# Patient Record
Sex: MALE | Race: WHITE | NOT HISPANIC OR LATINO | URBAN - METROPOLITAN AREA
[De-identification: names, ages, dates, MRNs, and addresses within clinical notes are randomized per-mention and may not be internally consistent; named-entity substitution may affect disease eponyms.]

---

## 2017-03-21 ENCOUNTER — ANESTHESIA EVENT (OUTPATIENT)
Dept: GASTROENTEROLOGY | Facility: CLINIC | Age: 52
End: 2017-03-21
Payer: COMMERCIAL

## 2017-03-21 ENCOUNTER — OFFICE VISIT (OUTPATIENT)
Dept: FAMILY MEDICINE | Facility: CLINIC | Age: 52
End: 2017-03-21
Payer: COMMERCIAL

## 2017-03-21 VITALS
OXYGEN SATURATION: 97 % | SYSTOLIC BLOOD PRESSURE: 146 MMHG | HEIGHT: 71 IN | HEART RATE: 99 BPM | TEMPERATURE: 97.3 F | RESPIRATION RATE: 18 BRPM | BODY MASS INDEX: 37.66 KG/M2 | DIASTOLIC BLOOD PRESSURE: 90 MMHG | WEIGHT: 269 LBS

## 2017-03-21 DIAGNOSIS — E11.9 TYPE 2 DIABETES MELLITUS WITHOUT COMPLICATION, WITH LONG-TERM CURRENT USE OF INSULIN (H): ICD-10-CM

## 2017-03-21 DIAGNOSIS — K21.9 GASTROESOPHAGEAL REFLUX DISEASE, ESOPHAGITIS PRESENCE NOT SPECIFIED: Primary | ICD-10-CM

## 2017-03-21 DIAGNOSIS — E78.2 MIXED HYPERLIPIDEMIA: ICD-10-CM

## 2017-03-21 DIAGNOSIS — R11.2 NON-INTRACTABLE VOMITING WITH NAUSEA, UNSPECIFIED VOMITING TYPE: ICD-10-CM

## 2017-03-21 DIAGNOSIS — I10 BENIGN ESSENTIAL HYPERTENSION: ICD-10-CM

## 2017-03-21 DIAGNOSIS — Z79.4 TYPE 2 DIABETES MELLITUS WITHOUT COMPLICATION, WITH LONG-TERM CURRENT USE OF INSULIN (H): ICD-10-CM

## 2017-03-21 PROCEDURE — 99204 OFFICE O/P NEW MOD 45 MIN: CPT | Performed by: FAMILY MEDICINE

## 2017-03-21 RX ORDER — DIAZEPAM 5 MG
5 TABLET ORAL EVERY 6 HOURS PRN
COMMUNITY
End: 2017-03-21

## 2017-03-21 RX ORDER — CETIRIZINE HYDROCHLORIDE 10 MG/1
10 TABLET ORAL
COMMUNITY
Start: 2010-07-22

## 2017-03-21 RX ORDER — ATORVASTATIN CALCIUM 10 MG/1
TABLET, FILM COATED ORAL
COMMUNITY
Start: 2016-12-05

## 2017-03-21 RX ORDER — LISINOPRIL AND HYDROCHLOROTHIAZIDE 12.5; 2 MG/1; MG/1
TABLET ORAL
COMMUNITY
Start: 2016-12-05

## 2017-03-21 RX ORDER — ASPIRIN 81 MG
200 TABLET, DELAYED RELEASE (ENTERIC COATED) ORAL
COMMUNITY

## 2017-03-21 RX ORDER — HYDROMORPHONE HYDROCHLORIDE 4 MG/1
4 TABLET ORAL EVERY 4 HOURS PRN
COMMUNITY

## 2017-03-21 RX ORDER — PHENOL 1.4 %
10 AEROSOL, SPRAY (ML) MUCOUS MEMBRANE
COMMUNITY

## 2017-03-21 RX ORDER — PEN NEEDLE, DIABETIC 30 GX3/16"
NEEDLE, DISPOSABLE MISCELLANEOUS
COMMUNITY
Start: 2016-12-06

## 2017-03-21 RX ORDER — PANTOPRAZOLE SODIUM 40 MG/1
40 TABLET, DELAYED RELEASE ORAL DAILY
Qty: 30 TABLET | Refills: 1 | Status: SHIPPED | OUTPATIENT
Start: 2017-03-21 | End: 2017-05-09

## 2017-03-21 RX ORDER — CYCLOBENZAPRINE HCL 10 MG
10 TABLET ORAL
COMMUNITY
Start: 2017-02-20

## 2017-03-21 RX ORDER — FENOFIBRATE 134 MG/1
CAPSULE ORAL
COMMUNITY
Start: 2016-12-05

## 2017-03-21 RX ORDER — ONDANSETRON 4 MG/1
4 TABLET, FILM COATED ORAL EVERY 8 HOURS PRN
Qty: 18 TABLET | Refills: 0 | Status: SHIPPED | OUTPATIENT
Start: 2017-03-21

## 2017-03-21 RX ORDER — LANSOPRAZOLE 30 MG/1
30 CAPSULE, DELAYED RELEASE ORAL
COMMUNITY
Start: 2017-02-01 | End: 2017-03-21

## 2017-03-21 RX ORDER — GABAPENTIN 300 MG/1
900 CAPSULE ORAL
COMMUNITY
Start: 2017-02-20

## 2017-03-21 NOTE — PATIENT INSTRUCTIONS
Medications for Acid Reflux  Your health care provider has told you that you have acid reflux. This is a condition that causes stomach acid to wash up into your throat. For most people, acid reflux is troubling but not dangerous. However, left untreated, acid reflux sometimes damages the esophagus. Medications can help control acid reflux and limit your risk of future problems.  Medications for Acid Reflux  Your health care provider may prescribe medication to help treat your acid reflux. Medication will be based on your symptoms and the results of any tests. Your health care provider will explain how to take your medication. You will also be told about possible side effects.  Reducing Stomach Acid  Your doctor may suggest antacids that you can buy over the counter. Antacids can give fast relief. Or you may be told to take a type of medication called H2 blockers. These are available over the counter and by prescription (for higher doses).  Blocking Stomach Acid  In more severe cases, your doctor may suggest stronger medications such as proton pump inhibitors (PPIs). These keep the stomach from making acid. They are often prescribed for long-term use.  Other Medications  If medications to reduce or block stomach acid don t work, you may be switched to another type of medication that helps the stomach empty better.    8916-4031 [employer]. [address] [place] [address]. All rights reserved. This information is not intended as a substitute for professional medical care. Always follow your healthcare professional's instructions.        Tips to Control Acid Reflux  To control acid reflux, you ll need to make some basic diet and lifestyle changes. The simple steps outlined below may be all you ll need to relieve discomfort.  Watch What You Eat      Avoid fatty foods and spicy foods.    Eat fewer acidic foods, such as citrus and tomato-based foods. These can increase symptoms.    Limit drinking  alcohol, caffeine, and fizzy beverages. All increase acid reflux.    Try limiting chocolate, peppermint, and spearmint. These can worsen acid reflux in some people.  Watch When You Eat    Avoid lying down for 3 hours after eating.    Do not snack before going to bed.  Raise Your Head    Raising your head and upper body by 4 inches to 6 inches helps limit reflux when you re lying down. Put blocks under the head of the bed frame to raise it.  Other Changes    Lose weight, if you need to.    Don t work out near bedtime.    Avoid tight-fitting clothes.    Limit aspirin and ibuprofen.    Stop smoking.     2186-5313 [employer]. [address] [place] [address], [place] [address]. All rights reserved. This information is not intended as a substitute for professional medical care. Always follow your healthcare professional's instructions.        Patient Education    Pantoprazole Sodium Gastro-resistant tablet    Pantoprazole Sodium Oral suspension    Pantoprazole Sodium Solution for injection  Pantoprazole Sodium Gastro-resistant tablet  What is this medicine?  PANTOPRAZOLE (pan TOE pra zole) prevents the production of acid in the stomach. It is used to treat gastroesophageal reflux disease (GERD), inflammation of the esophagus, and Zollinger-Perez syndrome.  This medicine may be used for other purposes; ask your health care provider or pharmacist if you have questions.  What should I tell my health care provider before I take this medicine?  They need to know if you have any of these conditions:    liver disease    low levels of magnesium in the blood    an unusual or allergic reaction to omeprazole, lansoprazole, pantoprazole, rabeprazole, other medicines, foods, dyes, or preservatives    pregnant or trying to get pregnant    breast-feeding  How should I use this medicine?  Take this medicine by mouth. Swallow the tablets whole with a drink of water. Follow the directions on the prescription label. Do not crush,  break, or chew. Take your medicine at regular intervals. Do not take your medicine more often than directed.  Talk to your pediatrician regarding the use of this medicine in children. While this drug may be prescribed for children as young as 5 years for selected conditions, precautions do apply.  Overdosage: If you think you have taken too much of this medicine contact a poison control center or emergency room at once.  NOTE: This medicine is only for you. Do not share this medicine with others.  What if I miss a dose?  If you miss a dose, take it as soon as you can. If it is almost time for your next dose, take only that dose. Do not take double or extra doses.  What may interact with this medicine?  Do not take this medicine with any of the following medications:    atazanavir    nelfinavir  This medicine may also interact with the following medications:    ampicillin    delavirdine    erlotinib    iron salts    medicines for fungal infections like ketoconazole, itraconazole and voriconazole    methotrexate    mycophenolate mofetil    warfarin  This list may not describe all possible interactions. Give your health care provider a list of all the medicines, herbs, non-prescription drugs, or dietary supplements you use. Also tell them if you smoke, drink alcohol, or use illegal drugs. Some items may interact with your medicine.  What should I watch for while using this medicine?  It can take several days before your stomach pain gets better. Check with your doctor or health care professional if your condition does not start to get better, or if it gets worse.  You may need blood work done while you are taking this medicine.  What side effects may I notice from receiving this medicine?  Side effects that you should report to your doctor or health care professional as soon as possible:    allergic reactions like skin rash, itching or hives, swelling of the face, lips, or tongue    bone, muscle or joint  pain    breathing problems    chest pain or chest tightness    dark yellow or brown urine    dizziness    fast, irregular heartbeat    feeling faint or lightheaded    fever or sore throat    muscle spasm    palpitations    redness, blistering, peeling or loosening of the skin, including inside the mouth    seizures    tremors    unusual bleeding or bruising    unusually weak or tired    yellowing of the eyes or skin  Side effects that usually do not require medical attention (Report these to your doctor or health care professional if they continue or are bothersome.):    constipation    diarrhea    dry mouth    headache    nausea  This list may not describe all possible side effects. Call your doctor for medical advice about side effects. You may report side effects to FDA at 9-934-FDA-7533.  Where should I keep my medicine?  Keep out of the reach of children.  Store at room temperature between 15 and 30 degrees C (59 and 86 degrees F). Protect from light and moisture. Throw away any unused medicine after the expiration date.  NOTE:This sheet is a summary. It may not cover all possible information. If you have questions about this medicine, talk to your doctor, pharmacist, or health care provider. Copyright  2016 [employer]

## 2017-03-21 NOTE — MR AVS SNAPSHOT
After Visit Summary   3/21/2017    Malcom Fink    MRN: [id number]           Patient Information     Date Of Birth          1965        Visit Information        Provider Department      3/21/2017 11:40 AM Everardo Noriega MD [facility]        Today's Diagnoses     Gastroesophageal reflux disease, esophagitis presence not specified    -  1    Non-intractable vomiting with nausea, unspecified vomiting type        Type 2 diabetes mellitus without complication, with long-term current use of insulin (H)        Mixed hyperlipidemia        Benign essential hypertension          Care Instructions      Medications for Acid Reflux  Your health care provider has told you that you have acid reflux. This is a condition that causes stomach acid to wash up into your throat. For most people, acid reflux is troubling but not dangerous. However, left untreated, acid reflux sometimes damages the esophagus. Medications can help control acid reflux and limit your risk of future problems.  Medications for Acid Reflux  Your health care provider may prescribe medication to help treat your acid reflux. Medication will be based on your symptoms and the results of any tests. Your health care provider will explain how to take your medication. You will also be told about possible side effects.  Reducing Stomach Acid  Your doctor may suggest antacids that you can buy over the counter. Antacids can give fast relief. Or you may be told to take a type of medication called H2 blockers. These are available over the counter and by prescription (for higher doses).  Blocking Stomach Acid  In more severe cases, your doctor may suggest stronger medications such as proton pump inhibitors (PPIs). These keep the stomach from making acid. They are often prescribed for long-term use.  Other Medications  If medications to reduce or block stomach acid don t work, you may be switched to another type of medication that  helps the stomach empty better.    7041-0445 [employer]. [address] [place] [address]. All rights reserved. This information is not intended as a substitute for professional medical care. Always follow your healthcare professional's instructions.        Tips to Control Acid Reflux  To control acid reflux, you ll need to make some basic diet and lifestyle changes. The simple steps outlined below may be all you ll need to relieve discomfort.  Watch What You Eat      Avoid fatty foods and spicy foods.    Eat fewer acidic foods, such as citrus and tomato-based foods. These can increase symptoms.    Limit drinking alcohol, caffeine, and fizzy beverages. All increase acid reflux.    Try limiting chocolate, peppermint, and spearmint. These can worsen acid reflux in some people.  Watch When You Eat    Avoid lying down for 3 hours after eating.    Do not snack before going to bed.  Raise Your Head    Raising your head and upper body by 4 inches to 6 inches helps limit reflux when you re lying down. Put blocks under the head of the bed frame to raise it.  Other Changes    Lose weight, if you need to.    Don t work out near bedtime.    Avoid tight-fitting clothes.    Limit aspirin and ibuprofen.    Stop smoking.     9583-0399 [employer]. [address] [place] [address]. All rights reserved. This information is not intended as a substitute for professional medical care. Always follow your healthcare professional's instructions.        Patient Education    Pantoprazole Sodium Gastro-resistant tablet    Pantoprazole Sodium Oral suspension    Pantoprazole Sodium Solution for injection  Pantoprazole Sodium Gastro-resistant tablet  What is this medicine?  PANTOPRAZOLE (pan TOE pra zole) prevents the production of acid in the stomach. It is used to treat gastroesophageal reflux disease (GERD), inflammation of the esophagus, and Zollinger-Perez syndrome.  This medicine may be used  for other purposes; ask your health care provider or pharmacist if you have questions.  What should I tell my health care provider before I take this medicine?  They need to know if you have any of these conditions:    liver disease    low levels of magnesium in the blood    an unusual or allergic reaction to omeprazole, lansoprazole, pantoprazole, rabeprazole, other medicines, foods, dyes, or preservatives    pregnant or trying to get pregnant    breast-feeding  How should I use this medicine?  Take this medicine by mouth. Swallow the tablets whole with a drink of water. Follow the directions on the prescription label. Do not crush, break, or chew. Take your medicine at regular intervals. Do not take your medicine more often than directed.  Talk to your pediatrician regarding the use of this medicine in children. While this drug may be prescribed for children as young as 5 years for selected conditions, precautions do apply.  Overdosage: If you think you have taken too much of this medicine contact a poison control center or emergency room at once.  NOTE: This medicine is only for you. Do not share this medicine with others.  What if I miss a dose?  If you miss a dose, take it as soon as you can. If it is almost time for your next dose, take only that dose. Do not take double or extra doses.  What may interact with this medicine?  Do not take this medicine with any of the following medications:    atazanavir    nelfinavir  This medicine may also interact with the following medications:    ampicillin    delavirdine    erlotinib    iron salts    medicines for fungal infections like ketoconazole, itraconazole and voriconazole    methotrexate    mycophenolate mofetil    warfarin  This list may not describe all possible interactions. Give your health care provider a list of all the medicines, herbs, non-prescription drugs, or dietary supplements you use. Also tell them if you smoke, drink alcohol, or use illegal drugs.  Some items may interact with your medicine.  What should I watch for while using this medicine?  It can take several days before your stomach pain gets better. Check with your doctor or health care professional if your condition does not start to get better, or if it gets worse.  You may need blood work done while you are taking this medicine.  What side effects may I notice from receiving this medicine?  Side effects that you should report to your doctor or health care professional as soon as possible:    allergic reactions like skin rash, itching or hives, swelling of the face, lips, or tongue    bone, muscle or joint pain    breathing problems    chest pain or chest tightness    dark yellow or brown urine    dizziness    fast, irregular heartbeat    feeling faint or lightheaded    fever or sore throat    muscle spasm    palpitations    redness, blistering, peeling or loosening of the skin, including inside the mouth    seizures    tremors    unusual bleeding or bruising    unusually weak or tired    yellowing of the eyes or skin  Side effects that usually do not require medical attention (Report these to your doctor or health care professional if they continue or are bothersome.):    constipation    diarrhea    dry mouth    headache    nausea  This list may not describe all possible side effects. Call your doctor for medical advice about side effects. You may report side effects to FDA at 5-560-FDA-0271.  Where should I keep my medicine?  Keep out of the reach of children.  Store at room temperature between 15 and 30 degrees C (59 and 86 degrees F). Protect from light and moisture. Throw away any unused medicine after the expiration date.  NOTE:This sheet is a summary. It may not cover all possible information. If you have questions about this medicine, talk to your doctor, pharmacist, or health care provider. Copyright  2016 [employer]              Follow-ups after your visit        Additional Services      "GASTROENTEROLOGY ADULT REF PROCEDURE ONLY       Last Lab Result: No results found for: CR  Body mass index is 37.52 kg/(m^2).     Needed:  No  Language:  English    Patient will be contacted to schedule procedure.     Please be aware that coverage of these services is subject to the terms and limitations of your health insurance plan.  Call member services at your health plan with any benefit or coverage questions.  Any procedures must be performed at a [facility] facility OR coordinated by your clinic's referral office.    Please bring the following with you to your appointment:    (1) Any X-Rays, CTs or MRIs which have been performed.  Contact the facility where they were done to arrange for  prior to your scheduled appointment.    (2) List of current medications   (3) This referral request   (4) Any documents/labs given to you for this referral                  Who to contact     If you have questions or need follow up information about today's clinic visit or your schedule please contact [facility] directly at [phone].  Normal or non-critical lab and imaging results will be communicated to you by [facility], letter or phone within 4 business days after the clinic has received the results. If you do not hear from us within 7 days, please contact the clinic through [employer] or phone. If you have a critical or abnormal lab result, we will notify you by phone as soon as possible.  Submit refill requests through [employer] or call your pharmacy and they will forward the refill request to us. Please allow 3 business days for your refill to be completed.          Additional Information About Your Visit        Clinical [facility] Information     [employer] lets you send messages to your doctor, view your test results, renew your prescriptions, schedule appointments and more. To sign up, go to [email or web address] . Click on \"Log in\" on the left side of the screen, which will take you to the Welcome " "page. Then click on \"Sign up Now\" on the right side of the page.     You will be asked to enter the access code listed below, as well as some personal information. Please follow the directions to create your username and password.     Your access code is: [email or web address]  Expires: 2017 12:10 PM     Your access code will  in 90 days. If you need help or a new code, please call your [facility] clinic or [phone].        Care EveryWhere ID     This is your Care EveryWhere ID. This could be used by other organizations to access your [facility] medical records  [id number]        Your Vitals Were     Pulse Temperature Respirations Height Pulse Oximetry BMI (Body Mass Index)    99 97.3  F (36.3  C) (Tympanic) 18 5' 11\" (1.803 m) 97% 37.52 kg/m2       Blood Pressure from Last 3 Encounters:   17 146/90    Weight from Last 3 Encounters:   17 269 lb (122 kg)              We Performed the Following     GASTROENTEROLOGY ADULT REF PROCEDURE ONLY          Today's Medication Changes          These changes are accurate as of: 3/21/17 12:10 PM.  If you have any questions, ask your nurse or doctor.               Start taking these medicines.        Dose/Directions    ondansetron 4 MG tablet   Commonly known as:  ZOFRAN   Used for:  Non-intractable vomiting with nausea, unspecified vomiting type   Started by:  Everardo Noriega MD        Dose:  4 mg   Take 1 tablet (4 mg) by mouth every 8 hours as needed for nausea   Quantity:  18 tablet   Refills:  0       pantoprazole 40 MG EC tablet   Commonly known as:  PROTONIX   Used for:  Gastroesophageal reflux disease, esophagitis presence not specified   Started by:  Everardo Noriega MD        Dose:  40 mg   Take 1 tablet (40 mg) by mouth daily Take 30-60 minutes before a meal.   Quantity:  30 tablet   Refills:  1            Where to get your medicines      These medications were sent to [facility] [address] [place] [address] - [address] [facility]  [address] [facility] " [facility] [address]     Phone:  [phone]     ondansetron 4 MG tablet    pantoprazole 40 MG EC tablet                Primary Care Provider    None Specified       No primary provider on file.        Thank you!     Thank you for choosing [facility]  for your care. Our goal is always to provide you with excellent care. Hearing back from our patients is one way we can continue to improve our services. Please take a few minutes to complete the written survey that you may receive in the mail after your visit with us. Thank you!             Your Updated Medication List - Protect others around you: Learn how to safely use, store and throw away your medicines at www.disposemymeds.org.          This list is accurate as of: 3/21/17 12:10 PM.  Always use your most recent med list.                   Brand Name Dispense Instructions for use    atorvastatin 10 MG tablet    LIPITOR     TAKE 1 TABLET DAILY TO LOWER CHOLESTEROL       blood glucose monitoring lancets      Test blood sugars 2 times a day       calcium carb 1250 mg (500 mg [place])/vitamin D 200 units 500-200 MG-UNIT per tablet    OSCAL with D         cetirizine 10 MG tablet    zyrTEC     Take 10 mg by mouth       cyclobenzaprine 10 MG tablet    FLEXERIL     Take 10 mg by mouth       DILAUDID 4 MG tablet   Generic drug:  HYDROmorphone      Take 4 mg by mouth every 4 hours as needed for moderate to severe pain       docusate sodium 100 MG tablet    COLACE     Take 200 mg by mouth       Fenofibrate Micronized 134 MG Caps      TAKE 1 CAPSULE DAILY WITH A MEAL TO LOWER CHOLESTEROL AND TRIGLYCERIDES       gabapentin 300 MG capsule    NEURONTIN     Take 900 mg by mouth       insulin glargine 100 UNIT/ML injection    LANTUS     INJECT 80 UNITS UNDER THE SKIN BEFORE BEDTIME       LANsoprazole 30 MG CR capsule    PREVACID     Take 30 mg by mouth       lisinopril-hydrochlorothiazide 20-12.5 MG per tablet    PRINZIDE/ZESTORETIC     TAKE 1 TABLET DAILY TO LOWER BLOOD  PRESSURE AND PROTECT THE KIDNEYS       Melatonin 10 MG Tabs tablet      Take 10 mg by mouth       metFORMIN 1000 MG tablet    GLUCOPHAGE     1,000 mg 2 times daily (with meals)       ondansetron 4 MG tablet    ZOFRAN    18 tablet    Take 1 tablet (4 mg) by mouth every 8 hours as needed for nausea       pantoprazole 40 MG EC tablet    PROTONIX    30 tablet    Take 1 tablet (40 mg) by mouth daily Take 30-60 minutes before a meal.       Pen Needles 32G X 4 MM Misc      As directed. For administering insulin at home.As directed. For administering insulin at home.       VALIUM 5 MG tablet   Generic drug:  diazepam      Take 5 mg by mouth every 6 hours as needed for sleep or pain       VICTOZA SC

## 2017-03-21 NOTE — PROGRESS NOTES
"  SUBJECTIVE:                                                    Malcom Fink is a 51 year old male who presents to clinic today for the following health issues:      GERD/Heartburn      Duration: Really bad yesterday     Description (location/character/radiation):     Intensity:  moderate    Accompanying signs and symptoms:  food getting stuck: no   nausea/vomiting/blood: YES- vomitting, dry heaves   abdominal pain: no - but hurts to throw up.   black/tarry or bloody stools: no :    History (similar episodes/previous evaluation):     Precipitating or alleviating factors:  worse with no particular food or drink.  current NSAID/Aspirin use: no     Therapies tried and outcome: Prevacid       He is knonow to have DM II, HTM, HLD, GERD and obesity. He drinks \"alot of coffee\" 4-6 big cups every day, drink 1 diet coke per day, denies smoking cigarette, takes supper at around 5:30 pm. Lifestyle is pretty sedentary. He is on disability for last 1 year. He mentions that he is taking heart burn pill for many years, usually works well but not in the recent past. He denies any hematemesis, abdominal pain, fever, chills or other relevant systemic symptoms.       Problem list and histories reviewed & adjusted, as indicated.  Additional history: as documented    There is no problem list on file for this patient.    Past Surgical History   Procedure Laterality Date     Orthopedic surgery         Social History   Substance Use Topics     Smoking status: Never Smoker     Smokeless tobacco: Not on file     Alcohol use No     History reviewed. No pertinent family history.      Current Outpatient Prescriptions   Medication Sig Dispense Refill     LANsoprazole (PREVACID) 30 MG CR capsule Take 30 mg by mouth       metFORMIN (GLUCOPHAGE) 1000 MG tablet 1,000 mg 2 times daily (with meals)       Melatonin 10 MG TABS tablet Take 10 mg by mouth       lisinopril-hydrochlorothiazide (PRINZIDE/ZESTORETIC) 20-12.5 MG per tablet TAKE 1 " TABLET DAILY TO LOWER BLOOD PRESSURE AND PROTECT THE KIDNEYS       blood glucose monitoring (i[employer] MICROLET) lancets Test blood sugars 2 times a day       Insulin Pen Needle (PEN NEEDLES) 32G X 4 MM MISC As directed. For administering insulin at home.As directed. For administering insulin at home.       insulin glargine (LANTUS) 100 UNIT/ML injection INJECT 80 UNITS UNDER THE SKIN BEFORE BEDTIME       atorvastatin (LIPITOR) 10 MG tablet TAKE 1 TABLET DAILY TO LOWER CHOLESTEROL       cetirizine (ZYRTEC) 10 MG tablet Take 10 mg by mouth       cyclobenzaprine (FLEXERIL) 10 MG tablet Take 10 mg by mouth       Fenofibrate Micronized 134 MG CAPS TAKE 1 CAPSULE DAILY WITH A MEAL TO LOWER CHOLESTEROL AND TRIGLYCERIDES       gabapentin (NEURONTIN) 300 MG capsule Take 900 mg by mouth       docusate sodium (COLACE) 100 MG tablet Take 200 mg by mouth       calcium carb 1250 mg, 500 mg [place],/vitamin D 200 units (OSCAL WITH D) 500-200 MG-UNIT per tablet        diazepam (VALIUM) 5 MG tablet Take 5 mg by mouth every 6 hours as needed for sleep or pain       HYDROmorphone (DILAUDID) 4 MG tablet Take 4 mg by mouth every 4 hours as needed for moderate to severe pain       Liraglutide (VICTOZA SC)        Allergies   Allergen Reactions     Butalbital Other (See Comments) and Nausea     Given post-op for headache. After 4 doses, he acted like he was intoxicated, slightly slurred speech, difficulty staying awake.     Morphine Nausea and Vomiting     Iv only     No lab results found.   BP Readings from Last 3 Encounters:   03/21/17 146/90    Wt Readings from Last 3 Encounters:   03/21/17 269 lb (122 kg)                  Labs reviewed in EPIC    Reviewed and updated as needed this visit by clinical staff       Reviewed and updated as needed this visit by Provider         ROS:  Constitutional, HEENT, cardiovascular, pulmonary, GI, , musculoskeletal, neuro, skin, endocrine and psych systems are negative, except as otherwise  "noted.    OBJECTIVE:                                                    /90 (Cuff Size: Adult Large)  Pulse 99  Temp 97.3  F (36.3  C) (Tympanic)  Resp 18  Ht 5' 11\" (1.803 m)  Wt 269 lb (122 kg)  SpO2 97%  BMI 37.52 kg/m2  Body mass index is 37.52 kg/(m^2).  GENERAL: alert, no distress and obese  NECK: no adenopathy, no asymmetry, masses, or scars and thyroid normal to palpation  RESP: lungs clear to auscultation - no rales, rhonchi or wheezes  CV: regular rates and rhythm, normal S1 S2, no S3 or S4, no murmur  ABDOMEN: soft, nontender, no hepatosplenomegaly, no masses and bowel sounds normal  NEURO: grossly intact   SKIN: No suspicious lesion      ASSESSMENT/PLAN:                                                          ICD-10-CM    1. Gastroesophageal reflux disease, esophagitis presence not specified K21.9 pantoprazole (PROTONIX) 40 MG EC tablet     GASTROENTEROLOGY ADULT REF PROCEDURE ONLY   2. Non-intractable vomiting with nausea, unspecified vomiting type R11.2 ondansetron (ZOFRAN) 4 MG tablet   3. Type 2 diabetes mellitus without complication, with long-term current use of insulin (H) E11.9     Z79.4    4. Mixed hyperlipidemia E78.2    5. Benign essential hypertension I10        Discussed in detail differentials and further management. Symptoms are likely secondary to severe gastroesophageal reflux disease, peptic ulcer disease needs to be ruled out. He is known to have reflux disease for many years. He drinks excessive amount of caffeine, lifestyle is pretty sedentary and past medical is significant for multiple comorbidities. Healthy eating habits discussed and suggested to reduce caffeine intake. Recommended regular exercise, weight loss, reducing calories and maintaining well hydration. Prevacid stopped and Protonix 40 mg daily ordered. Upper GI endoscopy referral placed in view of chronic (>5 years) symptoms. Other medications reviewed and no changes made. Written information provided as below. " Patient understood and in agreement with the above plan. All questions answered.         MEDICATIONS:   Orders Placed This Encounter   Medications     LANsoprazole (PREVACID) 30 MG CR capsule     Sig: Take 30 mg by mouth     metFORMIN (GLUCOPHAGE) 1000 MG tablet     Si,000 mg 2 times daily (with meals)     Melatonin 10 MG TABS tablet     Sig: Take 10 mg by mouth     lisinopril-hydrochlorothiazide (PRINZIDE/ZESTORETIC) 20-12.5 MG per tablet     Sig: TAKE 1 TABLET DAILY TO LOWER BLOOD PRESSURE AND PROTECT THE KIDNEYS     blood glucose monitoring ([employer] MICROLET) lancets     Sig: Test blood sugars 2 times a day     Insulin Pen Needle (PEN NEEDLES) 32G X 4 MM MISC     Sig: As directed. For administering insulin at home.As directed. For administering insulin at home.     insulin glargine (LANTUS) 100 UNIT/ML injection     Sig: INJECT 80 UNITS UNDER THE SKIN BEFORE BEDTIME     atorvastatin (LIPITOR) 10 MG tablet     Sig: TAKE 1 TABLET DAILY TO LOWER CHOLESTEROL     cetirizine (ZYRTEC) 10 MG tablet     Sig: Take 10 mg by mouth     cyclobenzaprine (FLEXERIL) 10 MG tablet     Sig: Take 10 mg by mouth     Fenofibrate Micronized 134 MG CAPS     Sig: TAKE 1 CAPSULE DAILY WITH A MEAL TO LOWER CHOLESTEROL AND TRIGLYCERIDES     gabapentin (NEURONTIN) 300 MG capsule     Sig: Take 900 mg by mouth     docusate sodium (COLACE) 100 MG tablet     Sig: Take 200 mg by mouth     calcium carb 1250 mg, 500 mg [place],/vitamin D 200 units (OSCAL WITH D) 500-200 MG-UNIT per tablet     diazepam (VALIUM) 5 MG tablet     Sig: Take 5 mg by mouth every 6 hours as needed for sleep or pain     HYDROmorphone (DILAUDID) 4 MG tablet     Sig: Take 4 mg by mouth every 4 hours as needed for moderate to severe pain     Liraglutide (VICTOZA SC)     pantoprazole (PROTONIX) 40 MG EC tablet     Sig: Take 1 tablet (40 mg) by mouth daily Take 30-60 minutes before a meal.     Dispense:  30 tablet     Refill:  1     ondansetron (ZOFRAN) 4 MG tablet     Sig: Take 1  tablet (4 mg) by mouth every 8 hours as needed for nausea     Dispense:  18 tablet     Refill:  0     Patient Instructions       Medications for Acid Reflux  Your health care provider has told you that you have acid reflux. This is a condition that causes stomach acid to wash up into your throat. For most people, acid reflux is troubling but not dangerous. However, left untreated, acid reflux sometimes damages the esophagus. Medications can help control acid reflux and limit your risk of future problems.  Medications for Acid Reflux  Your health care provider may prescribe medication to help treat your acid reflux. Medication will be based on your symptoms and the results of any tests. Your health care provider will explain how to take your medication. You will also be told about possible side effects.  Reducing Stomach Acid  Your doctor may suggest antacids that you can buy over the counter. Antacids can give fast relief. Or you may be told to take a type of medication called H2 blockers. These are available over the counter and by prescription (for higher doses).  Blocking Stomach Acid  In more severe cases, your doctor may suggest stronger medications such as proton pump inhibitors (PPIs). These keep the stomach from making acid. They are often prescribed for long-term use.  Other Medications  If medications to reduce or block stomach acid don t work, you may be switched to another type of medication that helps the stomach empty better.    8610-5263 [employer]. [address] [place] [address], [place] [address]. All rights reserved. This information is not intended as a substitute for professional medical care. Always follow your healthcare professional's instructions.        Tips to Control Acid Reflux  To control acid reflux, you ll need to make some basic diet and lifestyle changes. The simple steps outlined below may be all you ll need to relieve discomfort.  Watch What You Eat      Avoid fatty foods  and spicy foods.    Eat fewer acidic foods, such as citrus and tomato-based foods. These can increase symptoms.    Limit drinking alcohol, caffeine, and fizzy beverages. All increase acid reflux.    Try limiting chocolate, peppermint, and spearmint. These can worsen acid reflux in some people.  Watch When You Eat    Avoid lying down for 3 hours after eating.    Do not snack before going to bed.  Raise Your Head    Raising your head and upper body by 4 inches to 6 inches helps limit reflux when you re lying down. Put blocks under the head of the bed frame to raise it.  Other Changes    Lose weight, if you need to.    Don t work out near bedtime.    Avoid tight-fitting clothes.    Limit aspirin and ibuprofen.    Stop smoking.     7235-6346 [employer]. [address] [place] [address], [place] [address]. All rights reserved. This information is not intended as a substitute for professional medical care. Always follow your healthcare professional's instructions.        Patient Education    Pantoprazole Sodium Gastro-resistant tablet    Pantoprazole Sodium Oral suspension    Pantoprazole Sodium Solution for injection  Pantoprazole Sodium Gastro-resistant tablet  What is this medicine?  PANTOPRAZOLE (pan TOE pra zole) prevents the production of acid in the stomach. It is used to treat gastroesophageal reflux disease (GERD), inflammation of the esophagus, and Zollinger-Perez syndrome.  This medicine may be used for other purposes; ask your health care provider or pharmacist if you have questions.  What should I tell my health care provider before I take this medicine?  They need to know if you have any of these conditions:    liver disease    low levels of magnesium in the blood    an unusual or allergic reaction to omeprazole, lansoprazole, pantoprazole, rabeprazole, other medicines, foods, dyes, or preservatives    pregnant or trying to get pregnant    breast-feeding  How should I use this medicine?  Take this  medicine by mouth. Swallow the tablets whole with a drink of water. Follow the directions on the prescription label. Do not crush, break, or chew. Take your medicine at regular intervals. Do not take your medicine more often than directed.  Talk to your pediatrician regarding the use of this medicine in children. While this drug may be prescribed for children as young as 5 years for selected conditions, precautions do apply.  Overdosage: If you think you have taken too much of this medicine contact a poison control center or emergency room at once.  NOTE: This medicine is only for you. Do not share this medicine with others.  What if I miss a dose?  If you miss a dose, take it as soon as you can. If it is almost time for your next dose, take only that dose. Do not take double or extra doses.  What may interact with this medicine?  Do not take this medicine with any of the following medications:    atazanavir    nelfinavir  This medicine may also interact with the following medications:    ampicillin    delavirdine    erlotinib    iron salts    medicines for fungal infections like ketoconazole, itraconazole and voriconazole    methotrexate    mycophenolate mofetil    warfarin  This list may not describe all possible interactions. Give your health care provider a list of all the medicines, herbs, non-prescription drugs, or dietary supplements you use. Also tell them if you smoke, drink alcohol, or use illegal drugs. Some items may interact with your medicine.  What should I watch for while using this medicine?  It can take several days before your stomach pain gets better. Check with your doctor or health care professional if your condition does not start to get better, or if it gets worse.  You may need blood work done while you are taking this medicine.  What side effects may I notice from receiving this medicine?  Side effects that you should report to your doctor or health care professional as soon as  possible:    allergic reactions like skin rash, itching or hives, swelling of the face, lips, or tongue    bone, muscle or joint pain    breathing problems    chest pain or chest tightness    dark yellow or brown urine    dizziness    fast, irregular heartbeat    feeling faint or lightheaded    fever or sore throat    muscle spasm    palpitations    redness, blistering, peeling or loosening of the skin, including inside the mouth    seizures    tremors    unusual bleeding or bruising    unusually weak or tired    yellowing of the eyes or skin  Side effects that usually do not require medical attention (Report these to your doctor or health care professional if they continue or are bothersome.):    constipation    diarrhea    dry mouth    headache    nausea  This list may not describe all possible side effects. Call your doctor for medical advice about side effects. You may report side effects to FDA at 6-488-FDA-2055.  Where should I keep my medicine?  Keep out of the reach of children.  Store at room temperature between 15 and 30 degrees C (59 and 86 degrees F). Protect from light and moisture. Throw away any unused medicine after the expiration date.  NOTE:This sheet is a summary. It may not cover all possible information. If you have questions about this medicine, talk to your doctor, pharmacist, or health care provider. Copyright  2016 [employer]            Everardo Noriega MD  [facility]

## 2017-03-21 NOTE — NURSING NOTE
"Chief Complaint   Patient presents with     Heartburn       Initial /90 (Cuff Size: Adult Large)  Pulse 99  Temp 97.3  F (36.3  C) (Tympanic)  Resp 18  Ht 5' 11\" (1.803 m)  Wt 269 lb (122 kg)  SpO2 97%  BMI 37.52 kg/m2 Estimated body mass index is 37.52 kg/(m^2) as calculated from the following:    Height as of this encounter: 5' 11\" (1.803 m).    Weight as of this encounter: 269 lb (122 kg).  Medication Reconciliation: complete  "

## 2017-03-21 NOTE — ANESTHESIA PREPROCEDURE EVALUATION
Anesthesia Evaluation     . Pt has had prior anesthetic.            ROS/MED HX    ENT/Pulmonary:       Neurologic:       Cardiovascular:     (+) Dyslipidemia, hypertension----. : . . . :. .       METS/Exercise Tolerance:     Hematologic:         Musculoskeletal:         GI/Hepatic:     (+) GERD       Renal/Genitourinary:         Endo:     (+) type II DM .      Psychiatric:         Infectious Disease:         Malignancy:         Other:                     Physical Exam  Normal systems: cardiovascular, pulmonary and dental    Airway   Mallampati: II  TM distance: >3 FB  Neck ROM: full    Dental     Cardiovascular       Pulmonary                     Anesthesia Plan      History & Physical Review  History and physical reviewed and following examination; no interval change.    ASA Status:  2 .    NPO Status:  > 8 hours    Plan for MAC          Postoperative Care      Consents  Anesthetic plan, risks, benefits and alternatives discussed with:  Patient..                          .

## 2017-03-22 ENCOUNTER — SURGERY (OUTPATIENT)
Age: 52
End: 2017-03-22

## 2017-03-22 ENCOUNTER — HOSPITAL ENCOUNTER (OUTPATIENT)
Dept: ULTRASOUND IMAGING | Facility: CLINIC | Age: 52
End: 2017-03-22
Attending: SURGERY | Admitting: SURGERY
Payer: COMMERCIAL

## 2017-03-22 ENCOUNTER — HOSPITAL ENCOUNTER (OUTPATIENT)
Facility: CLINIC | Age: 52
Discharge: HOME | End: 2017-03-22
Attending: SURGERY | Admitting: SURGERY
Payer: COMMERCIAL

## 2017-03-22 ENCOUNTER — ANESTHESIA (OUTPATIENT)
Dept: GASTROENTEROLOGY | Facility: CLINIC | Age: 52
End: 2017-03-22
Payer: COMMERCIAL

## 2017-03-22 VITALS
WEIGHT: 269 LBS | DIASTOLIC BLOOD PRESSURE: 83 MMHG | SYSTOLIC BLOOD PRESSURE: 130 MMHG | OXYGEN SATURATION: 95 % | HEIGHT: 71 IN | RESPIRATION RATE: 16 BRPM | BODY MASS INDEX: 37.66 KG/M2 | HEART RATE: 104 BPM | TEMPERATURE: 98.3 F

## 2017-03-22 DIAGNOSIS — R11.2 NAUSEA AND VOMITING IN ADULT: Primary | ICD-10-CM

## 2017-03-22 DIAGNOSIS — R10.13 ABDOMINAL PAIN, EPIGASTRIC: ICD-10-CM

## 2017-03-22 DIAGNOSIS — R10.13 ABDOMINAL PAIN, EPIGASTRIC: Primary | ICD-10-CM

## 2017-03-22 LAB
GLUCOSE BLDC GLUCOMTR-MCNC: 132 MG/DL (ref 70–99)
UPPER GI ENDOSCOPY: NORMAL

## 2017-03-22 PROCEDURE — 88305 TISSUE EXAM BY PATHOLOGIST: CPT | Performed by: SURGERY

## 2017-03-22 PROCEDURE — 88305 TISSUE EXAM BY PATHOLOGIST: CPT | Mod: 26 | Performed by: SURGERY

## 2017-03-22 PROCEDURE — 82962 GLUCOSE BLOOD TEST: CPT

## 2017-03-22 PROCEDURE — 43239 EGD BIOPSY SINGLE/MULTIPLE: CPT | Performed by: SURGERY

## 2017-03-22 PROCEDURE — 25000125 ZZHC RX 250: Performed by: NURSE ANESTHETIST, CERTIFIED REGISTERED

## 2017-03-22 PROCEDURE — 37000008 ZZH ANESTHESIA TECHNICAL FEE, 1ST 30 MIN: Performed by: SURGERY

## 2017-03-22 PROCEDURE — 25000132 ZZH RX MED GY IP 250 OP 250 PS 637: Performed by: SURGERY

## 2017-03-22 PROCEDURE — 25000125 ZZHC RX 250: Performed by: SURGERY

## 2017-03-22 PROCEDURE — 76705 ECHO EXAM OF ABDOMEN: CPT

## 2017-03-22 PROCEDURE — 25000128 H RX IP 250 OP 636: Performed by: SURGERY

## 2017-03-22 PROCEDURE — 25800025 ZZH RX 258: Performed by: SURGERY

## 2017-03-22 RX ORDER — PROPOFOL 10 MG/ML
INJECTION, EMULSION INTRAVENOUS CONTINUOUS PRN
Status: DISCONTINUED | OUTPATIENT
Start: 2017-03-22 | End: 2017-03-22

## 2017-03-22 RX ORDER — LIDOCAINE 40 MG/G
CREAM TOPICAL
Status: DISCONTINUED | OUTPATIENT
Start: 2017-03-22 | End: 2017-03-22 | Stop reason: HOSPADM

## 2017-03-22 RX ORDER — SODIUM CHLORIDE, SODIUM LACTATE, POTASSIUM CHLORIDE, CALCIUM CHLORIDE 600; 310; 30; 20 MG/100ML; MG/100ML; MG/100ML; MG/100ML
INJECTION, SOLUTION INTRAVENOUS CONTINUOUS
Status: DISCONTINUED | OUTPATIENT
Start: 2017-03-22 | End: 2017-03-22 | Stop reason: HOSPADM

## 2017-03-22 RX ORDER — ONDANSETRON 2 MG/ML
4 INJECTION INTRAMUSCULAR; INTRAVENOUS
Status: COMPLETED | OUTPATIENT
Start: 2017-03-22 | End: 2017-03-22

## 2017-03-22 RX ORDER — PROPOFOL 10 MG/ML
INJECTION, EMULSION INTRAVENOUS PRN
Status: DISCONTINUED | OUTPATIENT
Start: 2017-03-22 | End: 2017-03-22

## 2017-03-22 RX ADMIN — SODIUM CHLORIDE, POTASSIUM CHLORIDE, SODIUM LACTATE AND CALCIUM CHLORIDE: 600; 310; 30; 20 INJECTION, SOLUTION INTRAVENOUS at 08:13

## 2017-03-22 RX ADMIN — PROPOFOL 50 MG: 10 INJECTION, EMULSION INTRAVENOUS at 08:40

## 2017-03-22 RX ADMIN — BENZOCAINE 4 SPRAY: 220 SPRAY, METERED PERIODONTAL at 08:37

## 2017-03-22 RX ADMIN — PROPOFOL 200 MCG/KG/MIN: 10 INJECTION, EMULSION INTRAVENOUS at 08:41

## 2017-03-22 RX ADMIN — PROPOFOL 50 MG: 10 INJECTION, EMULSION INTRAVENOUS at 08:41

## 2017-03-22 RX ADMIN — ONDANSETRON 4 MG: 2 INJECTION INTRAMUSCULAR; INTRAVENOUS at 08:15

## 2017-03-22 RX ADMIN — LIDOCAINE HYDROCHLORIDE 1 ML: 10 INJECTION, SOLUTION INFILTRATION; PERINEURAL at 08:13

## 2017-03-22 NOTE — ANESTHESIA POSTPROCEDURE EVALUATION
Patient: Malcom Fink    Procedure(s):  Gastroscopy   - Wound Class: II-Clean Contaminated    Diagnosis:REFLUX  Diagnosis Additional Information: No value filed.    Anesthesia Type:  MAC    Note:  Anesthesia Post Evaluation    Patient location during evaluation: Bedside  Patient participation: Able to fully participate in evaluation  Level of consciousness: awake and alert  Pain management: adequate  Airway patency: patent  Cardiovascular status: acceptable  Respiratory status: acceptable  Hydration status: acceptable  PONV: none     Anesthetic complications: None          Last vitals:  Vitals:    03/22/17 0740   BP: (!) 136/91   Resp: 20   Temp: 36.8  C (98.3  F)   SpO2: 99%         Electronically Signed By: Janiya Minaya CRNA, APRN CRNA  March 22, 2017  8:59 AM

## 2017-03-22 NOTE — ANESTHESIA CARE TRANSFER NOTE
Patient: Malcom Fink    Procedure(s):  Gastroscopy   - Wound Class: II-Clean Contaminated    Diagnosis: REFLUX  Diagnosis Additional Information: No value filed.    Anesthesia Type:   MAC     Note:    Patient transferred to:Phase II        Vitals: (Last set prior to Anesthesia Care Transfer)    CRNA VITALS  3/22/2017 0829 - 3/22/2017 0859      3/22/2017             Pulse: 111    SpO2: 97 %                Electronically Signed By: Janiya Minaya CRNA, APRN CRNA  March 22, 2017  8:59 AM

## 2017-03-22 NOTE — H&P
"[facility]  GI Pre-Procedure History & Physical      Name: Malcom Fink MRN#: [id number]   Age: 51 year old YOB: 1965     Date of Procedure: 3/22/2017    Primary care provider: No primary care provider on file.      Reason for Procedure:   Screening (V76.51), Esophageal reflux (530.81)    Problem List:     Patient Active Problem List   Diagnosis     Gastroesophageal reflux disease, esophagitis presence not specified     Type 2 diabetes mellitus without complication, with long-term current use of insulin (H)     Mixed hyperlipidemia     Benign essential hypertension       Current Outpatient Medications:      No current outpatient prescriptions on file.        Allergies:      Allergies   Allergen Reactions     Butalbital Other (See Comments) and Nausea     Given post-op for headache. After 4 doses, he acted like he was intoxicated, slightly slurred speech, difficulty staying awake.     Morphine Nausea and Vomiting     Has tolerated morphine recently        History:   Medical:  Past Medical History:   Diagnosis Date     Diabetes (H)      Surgical:  Past Surgical History:   Procedure Laterality Date     ORTHOPEDIC SURGERY       Family:  History reviewed. No pertinent family history.  Social:  Social History   Substance Use Topics     Smoking status: Never Smoker     Smokeless tobacco: Not on file     Alcohol use No       Physical Exam:   Vital Signs:  BP (!) 136/91  Temp 98.3  F (36.8  C) (Oral)  Resp 20  Ht 1.803 m (5' 11\")  Wt 122 kg (269 lb)  SpO2 99%  BMI 37.52 kg/m2  Airway assessment:  Patient is able to open mouth wide  Patient is able to stick out tongue       Lungs:  No increased work of breathing, good air exchange, clear to auscultation bilaterally, no crackles or wheezing.   Cardiovascular:  Normal- regular rate and rhythm, normal S1 - S2, no S3 or S4, and no murmur noted.  Gastrointestinal:  Abdomen soft, non-tender.  BS normal. No masses, organomegaly.    Assessment: "   Appropriately NPO.  No significant changes since H&P.    Plan:   Moderate (conscious) sedation     General and specific risks of the procedure of the procedure including pain, bleeding, and perforation were discussed. Possibility of missing lesions discussed. Prep and recovery were discussed. He asked appropriate questions and provided consent.      Patient's active problems diagnostically and therapeutically optimized for the planned procedure. Risks, benefits, alternatives to sedation and blood explained and consent obtained.  Risks, benefits, alternatives to procedure explained and consent obtained.    I have reviewed the history and physical, lab finding(s), diagnostic data, medications, and the plan for sedation.  I have determined this patient to be an appropriate candidate for the planned sedation/procedure and have reassessed the patient immediately prior to sedation/procedure.    Tylor Mcgee MD

## 2017-03-23 DIAGNOSIS — R11.2 NON-INTRACTABLE VOMITING WITH NAUSEA, UNSPECIFIED VOMITING TYPE: Primary | ICD-10-CM

## 2017-03-23 RX ORDER — SUCRALFATE 1 G/1
1 TABLET ORAL 4 TIMES DAILY
Qty: 40 TABLET | Refills: 1 | Status: SHIPPED | OUTPATIENT
Start: 2017-03-23 | End: 2017-05-30

## 2017-03-24 LAB — COPATH REPORT: NORMAL

## 2017-05-09 DIAGNOSIS — K21.9 GASTROESOPHAGEAL REFLUX DISEASE, ESOPHAGITIS PRESENCE NOT SPECIFIED: ICD-10-CM

## 2017-05-09 RX ORDER — PANTOPRAZOLE SODIUM 40 MG/1
TABLET, DELAYED RELEASE ORAL
Qty: 30 TABLET | Refills: 2 | Status: SHIPPED | OUTPATIENT
Start: 2017-05-09

## 2017-05-09 NOTE — TELEPHONE ENCOUNTER
pantoprazole (PROTONIX) 40 MG EC tablet      Last Written Prescription Date: 3/21/17  Last Fill Quantity: 30,  # refills: 1   Last Office Visit with [facility], [facility] or [facility] prescribing provider: 3/21/17

## 2017-05-09 NOTE — TELEPHONE ENCOUNTER
Per 03-21-17 OV-       Discussed in detail differentials and further management. Symptoms are likely secondary to severe gastroesophageal reflux disease, peptic ulcer disease needs to be ruled out. He is known to have reflux disease for many years. He drinks excessive amount of caffeine, lifestyle is pretty sedentary and past medical is significant for multiple comorbidities. Healthy eating habits discussed and suggested to reduce caffeine intake. Recommended regular exercise, weight loss, reducing calories and maintaining well hydration. Prevacid stopped and Protonix 40 mg daily ordered. Upper GI endoscopy referral placed in view of chronic (>5 years) symptoms. Other medications reviewed and no changes made. Written information provided as below. Patient understood and in agreement with the above plan. All questions answered.     Per 03-22-17 [facility]-    Impression:          - LA Grade A reflux esophagitis. Biopsied.                        - A medium amount of food (residue) in the stomach.                        - Normal stomach.                        - Normal examined duodenum.   Recommendation:      - Await pathology results.                        - Perform a RUQ ultrasound at the next available                        appointment.                                                            Refill given.  ROBERTO CARLOS Ashby RN

## 2017-05-30 DIAGNOSIS — R11.2 NON-INTRACTABLE VOMITING WITH NAUSEA, UNSPECIFIED VOMITING TYPE: ICD-10-CM

## 2017-05-30 RX ORDER — SUCRALFATE 1 G/1
1 TABLET ORAL 4 TIMES DAILY
Qty: 40 TABLET | Refills: 3 | Status: SHIPPED | OUTPATIENT
Start: 2017-05-30 | End: 2017-08-08

## 2017-05-30 NOTE — TELEPHONE ENCOUNTER
Carafate        Last Written Prescription Date: 03-23-17  Last Fill Quantity: 40,  # refills: 1   Last Office Visit with [facility], [facility] or [facility] prescribing provider: 03-21-17    Faviola Jacobsen  [facility] RN

## 2017-08-08 DIAGNOSIS — R11.2 NON-INTRACTABLE VOMITING WITH NAUSEA, UNSPECIFIED VOMITING TYPE: ICD-10-CM

## 2017-08-09 NOTE — TELEPHONE ENCOUNTER
Please see medication request and advise.  Unable to find written orders for medication.  Sonja MADDOX RN BSN PHN  [facility]

## 2017-08-11 RX ORDER — SUCRALFATE 1 G/1
1 TABLET ORAL 4 TIMES DAILY
Qty: 40 TABLET | Refills: 3 | Status: SHIPPED | OUTPATIENT
Start: 2017-08-11

## 2017-10-19 LAB
ALT SERPL-CCNC: 25 IU/L (ref 12–65)
AST SERPL-CCNC: 11 IU/L (ref 15–37)
CREAT SERPL-MCNC: 1 MG/DL (ref 0.6–1.3)
GFR SERPL CREATININE-BSD FRML MDRD: >60 ML/MIN/1.73M2
GLUCOSE SERPL-MCNC: 123 MG/DL (ref 70–100)
POTASSIUM SERPL-SCNC: 4 MMOL/L (ref 3.5–5.1)

## 2018-02-22 ENCOUNTER — TRANSFERRED RECORDS (OUTPATIENT)
Dept: HEALTH INFORMATION MANAGEMENT | Facility: CLINIC | Age: 53
End: 2018-02-22

## 2018-02-22 LAB
CHOLEST SERPL-MCNC: 225 MG/DL (ref 100–199)
HBA1C MFR BLD: 6.5 % (ref 0–5.7)
HDLC SERPL-MCNC: 35 MG/DL
LDLC SERPL CALC-MCNC: 131 MG/DL
LDLC SERPL CALC-MCNC: ABNORMAL MG/DL
NONHDLC SERPL-MCNC: 190 MG/DL
TRIGL SERPL-MCNC: 502 MG/DL

## 2018-03-20 ENCOUNTER — ALLIED HEALTH/NURSE VISIT (OUTPATIENT)
Dept: FAMILY MEDICINE | Facility: CLINIC | Age: 53
End: 2018-03-20
Payer: COMMERCIAL

## 2018-03-20 VITALS — SYSTOLIC BLOOD PRESSURE: 130 MMHG | DIASTOLIC BLOOD PRESSURE: 83 MMHG

## 2018-03-20 DIAGNOSIS — I10 BENIGN ESSENTIAL HYPERTENSION: Primary | ICD-10-CM

## 2018-03-20 PROCEDURE — 99207 ZZC NO CHARGE NURSE ONLY: CPT | Performed by: FAMILY MEDICINE

## 2018-03-20 NOTE — MR AVS SNAPSHOT
"              After Visit Summary   3/20/2018    Malcom Fink    MRN: [id number]           Patient Information     Date Of Birth          1965        Visit Information        Provider Department      3/20/2018 1:41 PM Everardo Noriega MD [facility]        Today's Diagnoses     Benign essential hypertension    -  1       Follow-ups after your visit        Who to contact     If you have questions or need follow up information about today's clinic visit or your schedule please contact [facility] directly at [phone].  Normal or non-critical lab and imaging results will be communicated to you by [employer], letter or phone within 4 business days after the clinic has received the results. If you do not hear from us within 7 days, please contact the clinic through [employer] or phone. If you have a critical or abnormal lab result, we will notify you by phone as soon as possible.  Submit refill requests through [employer] or call your pharmacy and they will forward the refill request to us. Please allow 3 business days for your refill to be completed.          Additional Information About Your Visit        MyChart Information     [employer] lets you send messages to your doctor, view your test results, renew your prescriptions, schedule appointments and more. To sign up, go to [email or web address] . Click on \"Log in\" on the left side of the screen, which will take you to the Welcome page. Then click on \"Sign up Now\" on the right side of the page.     You will be asked to enter the access code listed below, as well as some personal information. Please follow the directions to create your username and password.     Your access code is: [email or web address]  Expires: 2018  1:44 PM     Your access code will  in 90 days. If you need help or a new code, please call your [facility] or [phone].        Care EveryWhere ID     This is your Care EveryWhere ID. This could be used " by other organizations to access your [place] medical records  [id number]         Blood Pressure from Last 3 Encounters:   03/20/18 130/83   03/22/17 130/83   03/21/17 146/90    Weight from Last 3 Encounters:   03/22/17 269 lb (122 kg)   03/21/17 269 lb (122 kg)              Today, you had the following     No orders found for display       Primary Care Provider    None Specified       No primary provider on file.        Equal Access to Services     MERARI SIMPSON : Hadii page quinonezo Sorajiali, waaxda luqadaha, qaybta kaalmada adeteada, rosana carrington . So [facility] [phone].    ATENCIÓN: Si habla español, tiene a chino disposición servicios gratuitos de asistencia lingüística. Llame al [phone].    We comply with applicable federal civil rights laws and [place] laws. We do not discriminate on the basis of race, color, national origin, age, disability, sex, sexual orientation, or gender identity.            Thank you!     Thank you for choosing [facility]  for your care. Our goal is always to provide you with excellent care. Hearing back from our patients is one way we can continue to improve our services. Please take a few minutes to complete the written survey that you may receive in the mail after your visit with us. Thank you!             Your Updated Medication List - Protect others around you: Learn how to safely use, store and throw away your medicines at www.disposemymeds.org.          This list is accurate as of 3/20/18  1:44 PM.  Always use your most recent med list.                   Brand Name Dispense Instructions for use Diagnosis    atorvastatin 10 MG tablet    LIPITOR     TAKE 1 TABLET DAILY TO LOWER CHOLESTEROL        blood glucose monitoring lancets      Test blood sugars 2 times a day        Calcium carb-Vitamin D 500 mg Beaver-200 units 500-200 MG-UNIT per tablet    OSCAL with D;Oyster Shell Calcium          cetirizine 10 MG tablet    zyrTEC     Take  10 mg by mouth        cyclobenzaprine 10 MG tablet    FLEXERIL     Take 10 mg by mouth        DILAUDID 4 MG tablet   Generic drug:  HYDROmorphone      Take 4 mg by mouth every 4 hours as needed for moderate to severe pain        docusate sodium 100 MG tablet    COLACE     Take 200 mg by mouth        Fenofibrate Micronized 134 MG Caps      TAKE 1 CAPSULE DAILY WITH A MEAL TO LOWER CHOLESTEROL AND TRIGLYCERIDES        gabapentin 300 MG capsule    NEURONTIN     Take 900 mg by mouth        insulin glargine 100 UNIT/ML injection    LANTUS     INJECT 80 UNITS UNDER THE SKIN BEFORE BEDTIME        lisinopril-hydrochlorothiazide 20-12.5 MG per tablet    PRINZIDE/ZESTORETIC     TAKE 1 TABLET DAILY TO LOWER BLOOD PRESSURE AND PROTECT THE KIDNEYS        Melatonin 10 MG Tabs tablet      Take 10 mg by mouth        metFORMIN 1000 MG tablet    GLUCOPHAGE     1,000 mg 2 times daily (with meals)        ondansetron 4 MG tablet    ZOFRAN    18 tablet    Take 1 tablet (4 mg) by mouth every 8 hours as needed for nausea    Non-intractable vomiting with nausea, unspecified vomiting type       pantoprazole 40 MG EC tablet    PROTONIX    30 tablet    TAKE ONE TABLET BY MOUTH ONCE DAILY TAKE  30-60  MINUTES  BEFORE  A  MEAL    Gastroesophageal reflux disease, esophagitis presence not specified       Pen Needles 32G X 4 MM Misc      As directed. For administering insulin at home.As directed. For administering insulin at home.        sucralfate 1 GM tablet    CARAFATE    40 tablet    Take 1 tablet (1 g) by mouth 4 times daily    Non-intractable vomiting with nausea, unspecified vomiting type       VICTOZA SC

## 2022-02-17 PROBLEM — K21.9 GASTROESOPHAGEAL REFLUX DISEASE: Status: ACTIVE | Noted: 2017-03-21

## 2024-11-12 ENCOUNTER — TRANSFERRED RECORDS (OUTPATIENT)
Dept: HEALTH INFORMATION MANAGEMENT | Facility: CLINIC | Age: 59
End: 2024-11-12
Payer: COMMERCIAL

## 2024-11-12 ENCOUNTER — MEDICAL CORRESPONDENCE (OUTPATIENT)
Dept: HEALTH INFORMATION MANAGEMENT | Facility: CLINIC | Age: 59
End: 2024-11-12
Payer: COMMERCIAL

## 2024-11-12 ENCOUNTER — TELEPHONE (OUTPATIENT)
Dept: OPHTHALMOLOGY | Facility: CLINIC | Age: 59
End: 2024-11-12
Payer: COMMERCIAL

## 2024-11-12 NOTE — TELEPHONE ENCOUNTER
Health Call Center    Phone Message    May a detailed message be left on voicemail: yes     Reason for Call: Appointment Intake    Referring Provider Name: Cirilo Hernandez MD    Diagnosis and/or Symptoms: Double Vision, patient states it clears when he closes one eye. He states Dr. Hernandez seen something on MRI and he should be seen asap.    Action Taken: Message routed to:  [facility] ([facility] Ophthalmology.    Travel Screening: Not Applicable     Date of Service:

## 2024-11-13 ENCOUNTER — TRANSCRIBE ORDERS (OUTPATIENT)
Dept: OTHER | Age: 59
End: 2024-11-13

## 2024-11-13 DIAGNOSIS — H51.23 INTERNUCLEAR OPHTHALMOPLEGIA, BILATERAL: Primary | ICD-10-CM

## 2024-11-13 NOTE — TELEPHONE ENCOUNTER
This pt has [employer] for insurance     I will call     Racquel Chu Communication Facilitator on 11/13/2024 at 5:44 PM

## 2024-11-14 NOTE — TELEPHONE ENCOUNTER
Called and spoke to Wife - confirmed Malcom does have [employer] for insurance - wife is requesting the referral be sent to [place] as urgent     Called and spoke to [facility] ( referring )     Advised about the pts insurance and wife is requesting a referral be sent to [place] as urgent    Provided my direct number     Racquel Chu Communication Facilitator on 11/14/2024 at 9:29 AM

## (undated) RX ORDER — ONDANSETRON 2 MG/ML
INJECTION INTRAMUSCULAR; INTRAVENOUS
Status: DISPENSED
Start: 2017-03-22